# Patient Record
(demographics unavailable — no encounter records)

---

## 2024-12-02 NOTE — HISTORY OF PRESENT ILLNESS
[FreeTextEntry1] : 54 y/o male with history of hemorrhoids presents today for initial consultation.  He noted an episode of rectal pain last week and was seen in the ED at [facility] for evaluation.  He notes a painful, swollen lump on the left buttock/perirectal area. He thinks it may be a hemorrhoid or an abscess. He noted some bleeding after being examined in the ED.  He was prescribed Lidocaine cream.  He feels a little better today and notes the swelling has improved.  Denies any other drainage.   Of note, he has history of interstitial cystitis and notes some pelvic floor "issues" but reports that he has regular formed daily bowel movements.  He notes occasional constipation for which he uses suppositories.  Last colonoscopy was Feb 2024 and pt. reports was normal.

## 2024-12-02 NOTE — ASSESSMENT
[FreeTextEntry1] : 53-year-old male with resolving thrombosed external hemorrhoid.  Plan:  Proctosol cream 3 times daily for 2 weeks, sitz bath's 3 times daily, Metamucil, fiber supplementation increase fluid and fiber in diet.

## 2024-12-02 NOTE — PHYSICAL EXAM
[Normal Breath Sounds] : Normal breath sounds [Normal Heart Sounds] : normal heart sounds [Normal Rate and Rhythm] : normal rate and rhythm [No Rash or Lesion] : No rash or lesion [Alert] : alert [Oriented to Person] : oriented to person [Oriented to Place] : oriented to place [Oriented to Time] : oriented to time [Calm] : calm [Abdomen Masses] : No abdominal masses [Abdomen Tenderness] : ~T No ~M abdominal tenderness [Tender] : nontender [Exam Deferred] : exam was deferred [None] : no anal fissures seen [Excoriation] : no perianal excoriation [Multiple Sinus Tracts] : no perianal sinus tracts [Fistula] : no fistulas [Wart] : no warts [Ulcer ___ cm] : no ulcers [Pilonidal Cyst] : no pilonidal cysts [Pilonidal Sinus] : no pilonidal sinus [Pilonidal Sinus Draining] : no pilonidal sinus drainage [Tender, Swollen] : tender, swollen [Thrombosed] : that was thrombosed [Skin Tags] : there were no residual hemorrhoidal skin tags seen [Normal] : was normal [JVD] : no jugular venous distention  [Wheezing] : no wheezing was heard [de-identified] : benign [de-identified] : healthy, NAD [de-identified] : NC/AT [de-identified] : REBECCA, steady gait

## 2024-12-18 NOTE — ASSESSMENT
[FreeTextEntry1] : 53-year-old male with internal hemorrhoids.  Continue fiber supplementation follow-up after trip to [place] for internal hemorrhoid band ligation if symptoms continue to bother him.

## 2024-12-18 NOTE — PHYSICAL EXAM
[Abdomen Masses] : No abdominal masses [Tender] : nontender [Normal rectal exam] : exam was normal [None] : no anal fissures seen [Excoriation] : no perianal excoriation [Multiple Sinus Tracts] : no perianal sinus tracts [Fistula] : no fistulas [Wart] : no warts [Ulcer ___ cm] : no ulcers [Pilonidal Cyst] : no pilonidal cysts [Pilonidal Sinus] : no pilonidal sinus [Pilonidal Sinus Draining] : no pilonidal sinus drainage [Nonprolapsing] : a nonprolapsing (grade I) [Tender, Swollen] : nontender, non-swollen [Thrombosed] : that was not thrombosed [Skin Tags] : there were no residual hemorrhoidal skin tags seen [Normal] : was normal [de-identified] : benign [de-identified] : RP internal hemorrhoid is very swollen